# Patient Record
Sex: FEMALE | Employment: FULL TIME | URBAN - METROPOLITAN AREA
[De-identification: names, ages, dates, MRNs, and addresses within clinical notes are randomized per-mention and may not be internally consistent; named-entity substitution may affect disease eponyms.]

---

## 2017-01-26 DIAGNOSIS — E03.4 HYPOTHYROIDISM DUE TO ACQUIRED ATROPHY OF THYROID: Primary | ICD-10-CM

## 2017-01-27 RX ORDER — LEVOTHYROXINE SODIUM 0.07 MG/1
TABLET ORAL
Qty: 90 TABLET | Refills: 0 | Status: SHIPPED | OUTPATIENT
Start: 2017-01-27

## 2017-01-27 NOTE — TELEPHONE ENCOUNTER
Left a message on the pt to give the office a call. The pt is due for a physical and labs. This will need to be completed before further refills are sent. 1 refill was sent to mail order per failed protocol.

## 2017-05-11 ENCOUNTER — TELEPHONE (OUTPATIENT)
Dept: INTERNAL MEDICINE CLINIC | Facility: CLINIC | Age: 57
End: 2017-05-11

## 2017-05-11 NOTE — TELEPHONE ENCOUNTER
Pt requested mammogram report from 2015 be faxed to Nabil Gerber at [address] [place] [address] - fax #[phone]. Report was faxed on 5/11/17.

## 2019-06-20 ENCOUNTER — THERAPY VISIT (OUTPATIENT)
Dept: PHYSICAL THERAPY | Facility: CLINIC | Age: 59
End: 2019-06-20
Payer: COMMERCIAL

## 2019-06-20 DIAGNOSIS — M54.16 LUMBAR RADICULOPATHY: ICD-10-CM

## 2019-06-20 PROCEDURE — 97110 THERAPEUTIC EXERCISES: CPT | Mod: GP | Performed by: PHYSICAL THERAPIST

## 2019-06-20 PROCEDURE — 97161 PT EVAL LOW COMPLEX 20 MIN: CPT | Mod: GP | Performed by: PHYSICAL THERAPIST

## 2019-06-20 NOTE — LETTER
[facility]  [address]  [place] [id number] [phone]    2019    Re: Luisa Conway   :   1960  MRN:  [id number]   REFERRING PHYSICIAN:   Anne-Marie Ledezma    [facility]    Date of Initial Evaluation:  2019  Visits:  Rxs Used: 1  Reason for Referral:  Lumbar radiculopathy    EVALUATION SUMMARY    [facility] -- Lumbar    Date: 2019  Luisa Conway is a 58 year old female with a lumbar condition.   Referral: primary care  Work mechanical stresses:  sitting  Employment status:    Leisure mechanical stresses: n/a  Functional disability score (TAJ/STarT Back):  See TAJ in flowsheet  VAS score (0-10): 5-6/10  Patient goals/expectations:  Decrease pain    HISTORY:  Present symptoms: R sided low back pain, R thigh pain  Pain quality (sharp/shooting/stabbing/aching/burning/cramping):  achey   Paresthesia (yes/no):  R thigh can feel numb  Present since (onset date): two weeks (early 2019).     Symptoms (improving/unchanging/worsening):  unchanging.   Symptoms commenced as a result of: woke up with it   Condition occurred in the following environment:   home   Symptoms at onset (back/thigh/leg): center of low back  Constant symptoms (back/thigh/leg): low back  Intermittent symptoms (back/thigh/leg): thigh sx  Symptoms are made worse with the following: Always Sitting and Time of day - No effect   Symptoms are made better with the following: Other - heat, getting moving after sitting  Disturbed sleep (yes/no):  no   Sleeping postures (prone/sup/side R/L): supine  Previous episodes (0/1-5/6-10/11+): 0 Year of first episode: n/a  Previous history: none  Previous treatments: none    Specific Questions:  Cough/Sneeze/Strain (pos/neg): neg  Bowel/Bladder (normal/abnormal): normal  Gait  (normal/abnormal): noraml  Medications (nil/NSAIDS/analg/steroids/anticoag/other):  NSAIDS and Other - Thyroid  Medical allergies:  adhesive  General health (excellent/good/fair/poor):  good  Pertinent medical history:  Thyroid problems and Lupus  Imaging (None/Xray/MRI/Other):  Xray (scoliosis)  Recent or major surgery (yes/no):  Hysterectomy/Appendix  Night pain (yes/no): no  Accidents (yes/no): no  Re: Luisa Conway   :   1960      Unexplained weight loss (yes/no): no  Barriers at home: none  Other red flags: none    EXAMINATION    Posture:   Sitting (good/fair/poor): poor  Standing (good/fair/poor):fair  Lordosis (red/acc/normal): red  Correction of posture (better/worse/no effect): better  Lateral Shift (right/left/nil): nil  Relevant (yes/no):  n/a  Other Observations: none    Neurological:  Motor deficit:  none  Reflexes:  n/a  Sensory deficit:  none  Dural signs:  n/a    Movement Loss:   Bill Mod Min Nil Pain   Flexion    x    Extension   x     Side Gliding R    x    Side Gliding L    x P L low back pain (not typical area)     Test Movements:   During: produces, abolishes, increases, decreases, no effect, centralizing, peripheralizing   After: better, worse, no better, no worse, no effect, centralized, peripheralized    Pretest symptoms standing: none   Symptoms During Symptoms After ROM increased ROM decreased No Effect   FIS        Rep FIS        EIS        Rep EIS        Pretest symptoms lying: none    Symptoms During Symptoms After ROM increased ROM decreased No Effect   KIMBERLY        Rep KIMBERLY        EIL NE NE      Rep EIL  NE Better (dec pain with slouching, pain abolished in SGIS) x     If required, pretest symptoms:    Symptoms During Symptoms After ROM increased ROM decreased No Effect   SGIS - R        Rep SGIS - R        SGIS - L        Rep SGIS - L          Static Tests:  Sitting slouched:    Sitting erect:    Standing slouched   Standing erect:    Lying prone in extension:   Long  sitting:      Re: Luisa Conway   :   1960        Other Tests: none  Provisional Classification:  Derangement - Asymmetrical, unilateral, symptoms above knee    Principle of Management:  Education:  Posture support   Equipment provided:    Mechanical therapy (Y/N):  Y   Extension principle:  EIL with lock/sag 10-15 reps every 2-3 hrs      ASSESSMENT/PLAN:  Patient is a 58 year old female with lumbar complaints.    Patient has the following significant findings with corresponding treatment plan.                Diagnosis 1:  R lumbar above knee derangement  Pain -  manual therapy, self management, education, directional preference exercise and home program  Decreased ROM/flexibility - manual therapy, therapeutic exercise, therapeutic activity and home program  Inflammation - self management/home program  Impaired muscle performance - neuro re-education and home program  Decreased function - therapeutic activities and home program  Impaired posture - neuro re-education, therapeutic activities and home program  Cumulative Therapy Evaluation is: Low complexity.  Previous and current functional limitations:  (See Goal Flow Sheet for this information)    Short term and Long term goals: (See Goal Flow Sheet for this information)   Communication ability:  Patient appears to be able to clearly communicate and understand verbal and written communication and follow directions correctly.  Treatment Explanation - The following has been discussed with the patient:   RX ordered/plan of care  Anticipated outcomes  Possible risks and side effects  This patient would benefit from PT intervention to resume normal activities.   Rehab potential is excellent.    Frequency:  1 X week, once daily  Duration:  for4 weeks  Discharge Plan:  Achieve all LTG.  Independent in home treatment program.  Reach maximal therapeutic benefit.    Thank you for your referral.      INQUIRIES  Therapist: Ruiz Pappas DPT   [facility]  ATHLETIC MEDICINE - [facility] PHYSICAL THERAPY  [address]  [facility] [address]  Phone: [phone]  Fax: [phone]

## 2019-06-20 NOTE — PROGRESS NOTES
[facility] Initial Evaluation  Subjective:  HPI                    Objective:  System    Physical Exam    General     ROS    Assessment/Plan:    {REHAB NOTES:505433}

## 2019-06-20 NOTE — PROGRESS NOTES
[facility] Initial Evaluation -- Lumbar    Date: June 20, 2019  Luisa Conway is a 58 year old female with a lumbar condition.   Referral: primary care  Work mechanical stresses:  sitting  Employment status:    Leisure mechanical stresses: n/a  Functional disability score (TAJ/STarT Back):  See TAJ in flowsheet  VAS score (0-10): 5-6/10  Patient goals/expectations:  Decrease pain    HISTORY:    Present symptoms: R sided low back pain, R thigh pain  Pain quality (sharp/shooting/stabbing/aching/burning/cramping):  achey   Paresthesia (yes/no):  R thigh can feel numb    Present since (onset date): two weeks (early June 2019).     Symptoms (improving/unchanging/worsening):  unchanging.     Symptoms commenced as a result of: woke up with it   Condition occurred in the following environment:   home     Symptoms at onset (back/thigh/leg): center of low back  Constant symptoms (back/thigh/leg): low back  Intermittent symptoms (back/thigh/leg): thigh sx    Symptoms are made worse with the following: Always Sitting and Time of day - No effect   Symptoms are made better with the following: Other - heat, getting moving after sitting    Disturbed sleep (yes/no):  no Sleeping postures (prone/sup/side R/L): supine    Previous episodes (0/1-5/6-10/11+): 0 Year of first episode: n/a    Previous history: none  Previous treatments: none      Specific Questions:  Cough/Sneeze/Strain (pos/neg): neg  Bowel/Bladder (normal/abnormal): normal  Gait (normal/abnormal): noraml  Medications (nil/NSAIDS/analg/steroids/anticoag/other):  NSAIDS and Other - Thyroid  Medical allergies:  adhesive  General health (excellent/good/fair/poor):  good  Pertinent medical history:  Thyroid problems and Lupus  Imaging (None/Xray/MRI/Other):  Xray (scoliosis)  Recent or major surgery (yes/no):  Hysterectomy/Appendix  Night pain (yes/no): no  Accidents (yes/no): no  Unexplained weight loss (yes/no): no  Barriers at  home: none  Other red flags: none    EXAMINATION    Posture:   Sitting (good/fair/poor): poor  Standing (good/fair/poor):fair  Lordosis (red/acc/normal): red  Correction of posture (better/worse/no effect): better    Lateral Shift (right/left/nil): nil  Relevant (yes/no):  n/a  Other Observations: none    Neurological:    Motor deficit:  none  Reflexes:  n/a  Sensory deficit:  none  Dural signs:  n/a    Movement Loss:   Bill Mod Min Nil Pain   Flexion    x    Extension   x     Side Gliding R    x    Side Gliding L    x P L low back pain (not typical area)     Test Movements:   During: produces, abolishes, increases, decreases, no effect, centralizing, peripheralizing   After: better, worse, no better, no worse, no effect, centralized, peripheralized    Pretest symptoms standing: none   Symptoms During Symptoms After ROM increased ROM decreased No Effect   FIS        Rep FIS        EIS        Rep EIS        Pretest symptoms lying: none    Symptoms During Symptoms After ROM increased ROM decreased No Effect   KIMBERLY        Rep KIMBERLY        EIL NE NE      Rep EIL  NE Better (dec pain with slouching, pain abolished in SGIS) x     If required, pretest symptoms:    Symptoms During Symptoms After ROM increased ROM decreased No Effect   SGIS - R        Rep SGIS - R        SGIS - L        Rep SGIS - L          Static Tests:  Sitting slouched:    Sitting erect:    Standing slouched   Standing erect:    Lying prone in extension:   Long sitting:      Other Tests: none    Provisional Classification:  Derangement - Asymmetrical, unilateral, symptoms above knee    Principle of Management:  Education:  Posture support   Equipment provided:    Mechanical therapy (Y/N):  Y   Extension principle:  EIL with lock/sag 10-15 reps every 2-3 hrs  Lateral Principle:    Flexion principle:    Other:      ASSESSMENT/PLAN:    Patient is a 58 year old female with lumbar complaints.    Patient has the following significant findings with corresponding  treatment plan.                Diagnosis 1:  R lumbar above knee derangement  Pain -  manual therapy, self management, education, directional preference exercise and home program  Decreased ROM/flexibility - manual therapy, therapeutic exercise, therapeutic activity and home program  Inflammation - self management/home program  Impaired muscle performance - neuro re-education and home program  Decreased function - therapeutic activities and home program  Impaired posture - neuro re-education, therapeutic activities and home program    Cumulative Therapy Evaluation is: Low complexity.    Previous and current functional limitations:  (See Goal Flow Sheet for this information)    Short term and Long term goals: (See Goal Flow Sheet for this information)     Communication ability:  Patient appears to be able to clearly communicate and understand verbal and written communication and follow directions correctly.  Treatment Explanation - The following has been discussed with the patient:   RX ordered/plan of care  Anticipated outcomes  Possible risks and side effects  This patient would benefit from PT intervention to resume normal activities.   Rehab potential is excellent.    Frequency:  1 X week, once daily  Duration:  for4 weeks  Discharge Plan:  Achieve all LTG.  Independent in home treatment program.  Reach maximal therapeutic benefit.    Please refer to the daily flowsheet for treatment today, total treatment time and time spent performing 1:1 timed codes.

## 2019-08-15 PROBLEM — M54.16 LUMBAR RADICULOPATHY: Status: RESOLVED | Noted: 2019-06-20 | Resolved: 2019-08-15
